# Patient Record
(demographics unavailable — no encounter records)

---

## 2025-01-16 NOTE — HISTORY OF PRESENT ILLNESS
[FreeTextEntry1] : Transplant Hepatologist: Antony Akins DO Transplant Hepatology NP: Heydi Cr, [facility]  Rafaela Hawkins is an 82 y/o female with a PMH of OA, HTN, Hypothyroidism, Scoliosis, Anemia 2/2 GIB, and newly dx cirrhosis, here to establish care after recent hospitalization.   Patient unaware of any liver issues or abnormal liver tests up until she was advised to seek care at [facility] for w/u and management of anemia -- found to have hgb of 5 and tx w/ PRBC and ultimately found to have GIB 2/2 EV. S/p EGD w/ banding and d/c home on BB (11/19-11/23/24). Patient unaware of cirrhosis prior to this admission and etiology of liver disease not identified yet, prompting referral. No prior episodes of liver decompensation. No prior liver bx. No known family hx of liver disease. +Blood transfusions in 2024. Denies current and prior IVDU. Prior moderate ETOH use ~2-3/week with ~2-3 drinks of wine for many decades -- pt reports d/c all ETOH use since becoming aware of cirrhosis. No prior RPP. No DUI. No withdrawal sx. +Hx of obesity with heaviest weight ~250 lbs ~ 20 yrs ago and intentionally worked on weight loss. Maintained around 200 lbs for many years and has since worked on intentional weight loss since recent hospitalization with healthy dieting. Currently 195 lbs. Denies OTC meds/supps/herbals. Reports recent updated cardiac evaluation was unremarkable ([facility]), however, TTE has not been updated. Ethnicity from [place] Lives alone with adult children close by. Independent in ADLs/iADLs.   - 11/20/24 EGD: Grade III EV s/p band ligation  - 11/21/24 US Duplex: Nodular contour of liver c/w cirrhosis and increased pulsatility of hepatic veins c/w right heart dysfunction > no MRI done b/c of pacemaker  - 11/22/24 CT A/P w/ IV Contrast: +Cirrhosis, upper limit of normal spleen, small ascites, +varices and patent vasculature - 11/23/24 labs: H&H 8.8/27.9, Plts 112, Na 141, sCr 0.9, Alb 3.1, Tbili 1.6, AST/ALT 25/24, ALP 73, & INR 1.1 - 2025 CTCA reported normal   Patient's allergies, medications, past medical, surgical, family, and social histories were reviewed and updated as appropriate. Seen in clinic today, accompanied by her daughter. Reports that she feels well and has c/o abd cramping/gas and LIRA. Denies any recent fevers, chills, cough, lightheadedness, AMS, abdominal pain, n/v, diarrhea, hematochezia, hematemesis, and melena. Denies alcohol, tobacco, or recreational drug use.   Risk factors for liver disease: - Family history: No - Viral hepatitis: No - Metabolic syndrome: Yes - ETOH use: Yes - Autoimmune conditions: No  History of liver decompensation: - Hepatic Encephalopathy: No - HCC: No - Ascites: Yes, present on cross-sectional imaging - Varices/Bleed: Yes - Sarcopenia: No  Previous liver workup:  - Viral hepatitis serologies: Yes - Chronic liver disease serologies: Yes - US Abdomen: Yes - CT Abdomen: Yes - MRI Abdomen: No, unable to obtain 2/2 PPM  MELD 11 / Blood Type O

## 2025-01-16 NOTE — REVIEW OF SYSTEMS
[SOB on Exertion] : shortness of breath during exertion [Fever] : no fever [Chills] : no chills [Feeling Poorly] : not feeling poorly [Recent Weight Gain (___ Lbs)] : no recent weight gain [Chest Pain] : no chest pain [Palpitations] : no palpitations [Cough] : no cough [Abdominal Pain] : no abdominal pain [Vomiting] : no vomiting [Constipation] : no constipation [Diarrhea] : no diarrhea [Melena] : no melena [Dysuria] : no dysuria [Limb Swelling] : no limb swelling [Itching] : no itching [Confused] : no confusion [Dizziness] : no dizziness

## 2025-01-16 NOTE — PHYSICAL EXAM
[General Appearance - Alert] : alert [Sclera] : the sclera and conjunctiva were normal [] : the neck was supple [Respiration, Rhythm And Depth] : normal respiratory rhythm and effort [Auscultation Breath Sounds / Voice Sounds] : lungs were clear to auscultation bilaterally [Heart Rate And Rhythm] : heart rate was normal and rhythm regular [Bowel Sounds] : normal bowel sounds [Abdomen Soft] : soft [Abdomen Tenderness] : non-tender [Skin Color & Pigmentation] : normal skin color and pigmentation [Oriented To Time, Place, And Person] : oriented to person, place, and time [Scleral Icterus] : No Scleral Icterus [Spider Angioma] : No spider angioma(s) were observed [Ascites Shifting Dullness] : no shifting dullness of ascites [Asterixis] : no asterixis observed [Jaundice] : No jaundice

## 2025-01-16 NOTE — ASSESSMENT
[FreeTextEntry1] :  Rafaela Hawkins is an 80 y/o female with newly dx cirrhosis after episode of decompensation with EVH, pending confirmation of etiology of liver disease, here to establish care after recent hospitalization.   #Cirrhosis, well compensated, MELD 11 - Patient educated on the diagnosis and natural history of cirrhosis, including the manifestations of End Stage Liver Disease (hepatic encephalopathy, HCC, ascites, variceal bleeding, sarcopenia, etc). The medical management and/or candidacy for liver transplantation was discussed. The importance of ETOH/smoking cessation, adequate nutrition, activity, reporting new symptoms, and compliance with lab schedule and clinic visits reviewed. I also emphasized the importance of biannual HCC screening and variceal screening as determined. Patient advised to avoid NSAIDs and common hepatotoxic medications. The role of other medications like statins and acetaminophen safety also discussed. Hepatitis serologies will be checked if not already, and patient will need appropriate HAV/HBV vaccination if indicated. - Based on prior w/u and patient hx, differential ddx for etiology of liver disease is metALD +/- congestive hepatopathy > obtain full CLD w/u and viral screening in addition to TTE to evaluate for potential pulmonary HTN (11/21/24 US Duplex noted elevated increased pulsatility of hepatic veins that is typically c/w right heart dysfunction) - Euvolemic on exam, c/w Furosemide 20 mg/day (started and managed by Cardiology) - Advised to start daily weights and report > 2-3 lb/day weight gain - No indication for SBP ppx given well controlled ascites (no prior LVP) - HE: no sx, reinforced s/s and advised to report immediately - pHTN: 11/20/24 EGD noting Grade III EV s/p banding > will refer to GI for repeat EGD until EV are eradicated and c/w Nadolol 40 mg PO qhs (consider switch to Carvedilol at f/u appt for greater reduction in portal pressures) - Start home BP/HR monitoring - HCC: 11/22/24 CT A/P w/ IV Contrast w/o c/f malignancy, update tumor markers and maintain biannual screening - Counseled on importance of adhering to high protein, low Na and low carb diet in efforts of preventing sarcopenia and encouraging continued weight loss - Reinforced importance of continued ETOH abstinence and reviewed risk for liver decompensation with ETOH use - Advised to avoid nephrotoxic agents - Maintain f/u with other providers for optimization of comorbidities (need Cardiology records) - Reviewed s/s of liver decompensation and when to seek medical attention - No indication for liver txp evaluation given advanced age, comorbidities, and low MELD; will medically manage  Update labs today.  Will call after review of above - RTC in 2 months.   Heydi Cr, MSN, [facility] Transplant Hepatology Nurse Practitioner [facility] [address] [place] [address] T: [phone] | F: [phone]

## 2025-07-08 NOTE — PHYSICAL EXAM
[General Appearance - Alert] : alert [Sclera] : the sclera and conjunctiva were normal [] : the neck was supple [Respiration, Rhythm And Depth] : normal respiratory rhythm and effort [Auscultation Breath Sounds / Voice Sounds] : lungs were clear to auscultation bilaterally [Heart Rate And Rhythm] : heart rate was normal and rhythm regular [Heart Sounds] : normal S1 and S2 [Bowel Sounds] : normal bowel sounds [Abdomen Soft] : soft [Abdomen Tenderness] : non-tender [Skin Color & Pigmentation] : normal skin color and pigmentation [Oriented To Time, Place, And Person] : oriented to person, place, and time [Scleral Icterus] : No Scleral Icterus [Spider Angioma] : No spider angioma(s) were observed [Ascites Shifting Dullness] : no shifting dullness of ascites [Asterixis] : no asterixis observed [Jaundice] : No jaundice [FreeTextEntry1] : ? ascites on percussion

## 2025-07-08 NOTE — ASSESSMENT
[FreeTextEntry1] : 82 y/o female with decompensated cirrhosis 2/2 metALD +/- congestive hepatopathy, with recurrent GIB 2/2 pHTN, here for follow-up after recent hospitalization.   #Cirrhosis,   - 1/16/25 [facility] w/u and viral screening: elevated IgG, +BRADFORD, and +HAV immunity > etiology of cirrhosis is 2/2 metALD +/- congestive hepatopathy (11/21/24 US Duplex noted elevated increased pulsatility of hepatic veins that is typically c/w right heart dysfunction > repeat US Doppler) - sl edema on exam, c/w Furosemide 40 mg/day , off Spironolactone 25 mg/day (started and managed by Cardiology)-  - Advised to start daily weights and report > 2-3 lb/day weight gain - will do US to look for ascites, low Na diet advised - HE: no sx, reinforced s/s and advised to report immediately - pHTN: 3/21/25 EGD w/o active bleeding and portal hypertensive gastropathy and switched to Carvedilol during hospitalization given recurrent GIB > BP at goal, c/w Carvedilol 6.25 mg PO BID and need f/u appt with Dr. Salinas - Hopeful for greater reduction of portal pressures with switch of Nadolol to Carvediolol, however, should GIB remain recurrent will need to consider more advanced therapies with potential TIPS - Start home BP/HR monitoring - HCC: 11/22/24 CT A/P w/ IV Contrast w/o c/f malignancy > update now and maintain biannual screening - Counseled on importance of adhering to high protein, low Na and low carb diet in efforts of preventing sarcopenia and encouraging continued weight loss - Reinforced importance of continued ETOH abstinence and reviewed risk for liver decompensation with ETOH use - Advised to avoid nephrotoxic agents - Maintain f/u with other providers for optimization of comorbidities (need Cardiology records) - Reviewed s/s of liver decompensation and when to seek medical attention - No indication for liver txp evaluation given advanced age, comorbidities, and low MELD; will medically manage - check labs today and adjust diuretics/ace 1 and bblk based on Cr follow up in4 months I spent total of 40 minutes on this patient encounter. All questions answered, demonstrated understanding

## 2025-07-08 NOTE — HISTORY OF PRESENT ILLNESS
[de-identified] :  80 y/o female with a PMH of OA, HTN, Hypothyroidism, Scoliosis, Anemia 2/2 GIB, and decompensated cirrhosis, here for follow-up. cardiology - Nael Saavedra MD no recent hospitalization last EGD in March '25- s/p banding last labs in early June'25, had GARY, still on diuretics and ace 1 has some bloating with SOB  Patient unaware of any liver issues or abnormal liver tests up until she was advised to seek care at [facility] for w/u and management of anemia -- found to have hgb of 5 and tx w/ PRBC and ultimately found to have GIB 2/2 EV. S/p EGD w/ banding and d/c home on BB (11/19-11/23/24). Patient unaware of cirrhosis prior to this admission and etiology of liver disease not identified yet, prompting referral. No prior episodes of liver decompensation. No prior liver bx. No known family hx of liver disease. +Blood transfusions in 2024. Denies current and prior IVDU. Prior moderate ETOH use ~2-3/week with ~2-3 drinks of wine for many decades -- pt reports d/c all ETOH use since becoming aware of cirrhosis. No prior RPP. No DUI. No withdrawal sx. +Hx of obesity with heaviest weight ~250 lbs ~ 20 yrs ago and intentionally worked on weight loss. Maintained around 200 lbs for many years and has since worked on intentional weight loss since recent hospitalization with healthy dieting. Currently 195 lbs. Denies OTC meds/supps/herbals. Reports recent updated cardiac evaluation was unremarkable ([facility]), however, TTE has not been updated. Ethnicity from [place] Lives alone with adult children close by. Independent in ADLs/iADLs.  - 11/20/24 EGD: Grade III EV s/p band ligation - 11/21/24 US Duplex: Nodular contour of liver c/w cirrhosis and increased pulsatility of hepatic veins c/w right heart dysfunction > no MRI done b/c of pacemaker - 11/22/24 CT A/P w/ IV Contrast: +Cirrhosis, upper limit of normal spleen, small ascites, +varices and patent vasculature - 2025 CTCA reported normal - 1/16/25 [facility] w/u and viral screening: elevated IgG, +BRADFORD, and +HAV immunity   Since last visit, she established care with Dr. Salinas and underwent EGD on 2/18/25 that noted one cord of non-bleeding varices in the lower third of the esophagus, no banding done. However, ended up hospitalized at [facility] after p/w melena and had repeat EGD that revealed post-banding non-bleeding ulcer that didn't require tx but noted some residual varices. Now s/p repeat EGD on 3/6/25 that noted Grade II EV and banded x 1. About a week or so after recent EGD she had an unintentional fall at home and developed large amount of melena and was hospitalized at [facility]. 3/21/25 EGD w/o active bleeding and portal hypertensive gastropathy; medically managed with PPI + Octeotride gtt. Nadolol d/c and switched to Carvedilol. D/C home on 3/24 and noted to develop severe b/l LE edema and had f/u appt with Cardiologist, where she was started on Spironolactone in addition to Furosemide. Patient's allergies, medications, past medical, surgical, family, and social histories were reviewed and updated as appropriate. Seen in clinic today, accompanied by her daughter. Reports that she feels well and getting back to baseline state of health after recent hospitalization. Working on diet modifications and lost ~5 lbs since last visit, currently 190 lbs. Participating in outpatient PT for generalized deconditioning. Denies any recent fevers, chills, cough, lightheadedness, AMS, abdominal pain, n/v, diarrhea, hematochezia, hematemesis, and melena. Denies alcohol, tobacco, or recreational drug use.  MELD 9 (1/17/25)

## 2025-07-08 NOTE — REVIEW OF SYSTEMS
[Lower Ext Edema] : lower extremity edema [Limb Swelling] : limb swelling [Fever] : no fever [Chills] : no chills [Feeling Poorly] : not feeling poorly [Recent Weight Gain (___ Lbs)] : no recent weight gain [Chest Pain] : no chest pain [Palpitations] : no palpitations [Shortness Of Breath] : no shortness of breath [Cough] : no cough [Abdominal Pain] : no abdominal pain [Vomiting] : no vomiting [Constipation] : no constipation [Diarrhea] : no diarrhea [Melena] : no melena [Dysuria] : no dysuria [Itching] : no itching [Confused] : no confusion [Dizziness] : no dizziness